# Patient Record
Sex: FEMALE | Race: WHITE | NOT HISPANIC OR LATINO | URBAN - METROPOLITAN AREA
[De-identification: names, ages, dates, MRNs, and addresses within clinical notes are randomized per-mention and may not be internally consistent; named-entity substitution may affect disease eponyms.]

---

## 2017-08-01 ENCOUNTER — GENERIC CONVERSION - ENCOUNTER (OUTPATIENT)
Dept: OTHER | Facility: OTHER | Age: 37
End: 2017-08-01

## 2017-09-19 ENCOUNTER — GENERIC CONVERSION - ENCOUNTER (OUTPATIENT)
Dept: OTHER | Facility: OTHER | Age: 37
End: 2017-09-19

## 2017-11-07 ENCOUNTER — GENERIC CONVERSION - ENCOUNTER (OUTPATIENT)
Dept: OTHER | Facility: OTHER | Age: 37
End: 2017-11-07

## 2018-01-13 NOTE — MISCELLANEOUS
Provider Comments  Provider Comments:   Magdalene Mansfield did not show up for her scheduled Level II ultrasound study in the [facility], [facility]  in [address] [place] [address] on Tuesday morning, November 7, 2017 at 11:00 AM  Additionally, she did not call to reschedule this appointment        Signatures   Electronically signed by : TOM Castro ; Nov 7 2017 12:51PM EST                       (Author)

## 2018-01-14 NOTE — PROGRESS NOTES
2016         RE: Jorge Weaver                               To: Rakesh TenaTOM S N , C N M    MR#: [id number]                                   [address]   ENC: [id number]                             Fax: [phone]   (Exam #: [id number])      The LMP of this 31year old,  3, para 1 patient was unknown, her   working WILD is 2016 and the current gestational age is 33 weeks 6   days by  [facility] [place] [address]  A sonographic examination was performed on 2016 using real time equipment  The ultrasound examination was performed   using abdominal technique  The patient has a BMI of 33 0  Her blood   pressure today was 98/66        Cardiac motion was observed at 125 bpm       INDICATIONS      fetal anatomical survey   pregnancy dating   diabetes, gestational   obesity      Exam Types      LEVEL II      RESULTS      Fetus # 1 of 1   Vertex presentation   Fetal growth appeared normal   Placenta Location = Posterior   No placenta previa   Placenta Grade = II      MEASUREMENTS (* Included In Average GA)      AC              25 4 cm        29 weeks 4 days* (40%)   BPD              7 6 cm        30 weeks 3 days* (52%)   HC              28 2 cm        30 weeks 4 days* (46%)   Femur            5 8 cm        30 weeks 1 day * (49%)      Humerus          5 2 cm        30 weeks 4 days  (63%)   Radius           4 5 cm        32 weeks 2 days   Ulna             4 9 cm        30 weeks 6 days   Tibia            5 1 cm        30 weeks 4 days  (62%)   Fibula           5 1 cm        29 weeks 6 days      Cerebellum       3 8 cm        32 weeks 5 days   Biorbit          4 8 cm        30 weeks 1 day   CisternaMagna    8 5 mm      HC/AC           1 11   FL/AC           0 23   FL/BPD          0 76   EFW (Ac/Fl/Hc)  1491 grams - 3 lbs 5 oz                 (47%)      THE AVERAGE GESTATIONAL AGE is 30 weeks 1 day +/- 18 days       AMNIOTIC FLUID      Q1: 2 1      Q2: 3 0      Q3: 3 3      Q4: 2 8   EUSEBIO Total = 11 2 cm   Amniotic Fluid: Normal      ANATOMY      Head                                    Normal   Face/Neck                               See Details   Th  Cav  Normal   Heart                                   See Details   Abd  Cav  Normal   Stomach                                 Normal   Right Kidney                            Normal   Left Kidney                             Normal   Bladder                                 Normal   Abd  Wall                               Normal   Spine                                   Normal   Extrems                                 See Details   Genitalia                               Normal   Placenta                                Normal      ANATOMY DETAILS      Visualized Appearing Sonographically Normal:   HEAD: (Calvarium, BPD Level, Lateral Ventricles, Choroid Plexus,   Cerebellum, Cisterna Magna);    FACE/NECK: (Neck, Profile, Orbits,   Nose/Lips, Palate, Face);    TH  CAV : (Diaphragm); HEART: (Four   Chamber View, Proximal Left Outflow, Proximal Right Outflow, Short Axis of   Greater Vessels, Cardiac Axis, Interventricular Septum, Interatrial   Septum, Cardiac Position);    ABD  CAV : (Gall Bladder);    STOMACH, RIGHT   KIDNEY, LEFT KIDNEY, BLADDER, ABD  WALL, SPINE: (Cervical Spine, Thoracic   Spine, Lumbar Spine, Sacrum);    EXTREMS: (Rt Humerus, Rt Forearm, Lt   Hand, Rt Hand, Lt Femur, Rt Femur, Rt Low Leg, Lt Foot, Rt Foot);      GENITALIA (Male), PLACENTA      Suboptimally Visualized:   EXTREMS: (Lt Humerus, Lt Forearm, Lt Low Leg)      Not Visualized:   HEART: (Aortic Arch, Ductal Arch)      ANATOMY COMMENTS      No fetal structural abnormality or ultrasound marker for aneuploidy is   identified on the Level II ultrasound study today  The views of the   ARCHES, PCI are limited by FETAL POSITION   The genitalia were reviewed and   found to be male  The patient is not aware of the results of the fetal   sex  Fetal growth and amniotic fluid volume appear normal   Active   movement of the fetal body & extremities was seen  There is no suspicion   of a subchorionic bleed  ADNEXA      The left ovary was not visualized  The right ovary appeared normal and   measured 3 6 x 2 4 x 1 6 cm with a volume of 7 2 cc  IMPRESSION      Mendoza IUP   30 weeks and 1 day by this ultrasound  (WILD=2016)   29 weeks and 6 days by 1st Tri Sono  (WILD=2016)   Vertex presentation   Fetal growth appeared normal   Regular fetal heart rate of 125 bpm   Posterior placenta   No placenta previa      GENERAL COMMENT         I had the pleasure of seeing Cecile Amaro in the  [facility]   [place]  on  for level II ultrasound  She was recently diagnosed   with gestational diabetes and will meet with her diabetes team in the near   future  She has an elevated body mass index of 33  She had a normal   vaginal delivery in   She has a history of herpes simplex virus  She   has also had an appendectomy in the past  She denies any pregnancy   complications to this point  Today's ultrasound was reassuring  A viable fetus was seen in the vertex   presentation  The placenta is posterior in location and there is no   evidence of a placenta previa  The placental cord insertion site appeared   very central which is reassuring  Amniotic fluid appeared normal  Fetal   growth appeared very appropriate and correlated well with her due date  There were no obvious anomalies seen today  The anatomic survey was not   completed today as we did not see the ductal or aortic arch well today  Only the right ovary was seen and it appeared normal  Left ovary was not   well seen  We have done a transvaginal ultrasound to assess for any days   of previa and there was no evidence of any vessels directly over her   cervix  The patient appeared well-nourished, well-developed, and in no apparent   distress  Her uterus is nontender  Her abdomen was gravid and nontender  We discussed kick counts in the office today  We discussed the 10 kicks in   2 hour rule  I asked her to call your office if criteria are not met  She   should continue to do her kick counts on a daily basis  Kick counts should   begin at 28 weeks  Her gestational diabetes appears well controlled  There were no signs of   fetal hyperglycemia on today's ultrasound  I encouraged her to continue to   achieve euglycemia  She should continue to fax her blood sugar reports to   our Diabetes in Pregnancy Program  A post partum 75 gram 2 hour glucose   tolerance test is indicated  MISSED ANATOMY: Ductal and aortic arch      NEW FINDINGS ON TODAY'S ULTRASOUND: None, very reassuring ultrasound today   in the  [address]: Giorgio Taylor had a reassuring ultrasound  The fetus appears to be   growing well and today's ultrasound correlated well with her due date  There were no obvious anatomic abnormalities  It is too late to be   performing down syndrome screening however  We did view her cervix as we   initially could not tell where the cord insertion into the placental is   located however we did not see any vessels directly over her cervix and   eventually did see a normal central cord insertion  She does have   gestational diabetes and I did recommend that she see our diabetes team at   [address]  A followup growth scan also appears indicated  Thank   you kindly for this referral          Total face-to-face time with the patient, excluding ultrasound time was 15   minutes with more than 50% of the time devoted to counseling and   coordination of care  Thank you very much for allowing me to participate in the care of your   patient  If you have any questions or concerns about today's visit, please   do not hesitate to call me  Sincerely,      TOM Quintana  Maternal Fetal Medicine      FLORENCIA Beaulieu M D     Maternal-Fetal Medicine   Electronically signed 04/08/16 15:00

## 2025-03-13 ENCOUNTER — OFFICE VISIT (OUTPATIENT)
Age: 45
End: 2025-03-13
Payer: COMMERCIAL

## 2025-03-13 VITALS
SYSTOLIC BLOOD PRESSURE: 121 MMHG | BODY MASS INDEX: 26.45 KG/M2 | DIASTOLIC BLOOD PRESSURE: 74 MMHG | HEART RATE: 84 BPM | OXYGEN SATURATION: 100 % | WEIGHT: 126 LBS | RESPIRATION RATE: 18 BRPM | TEMPERATURE: 97.8 F | HEIGHT: 58 IN

## 2025-03-13 DIAGNOSIS — L90.5 SCAR: Primary | ICD-10-CM

## 2025-03-13 PROCEDURE — G0382 LEV 3 HOSP TYPE B ED VISIT: HCPCS | Performed by: PHYSICIAN ASSISTANT

## 2025-03-13 PROCEDURE — S9083 URGENT CARE CENTER GLOBAL: HCPCS | Performed by: PHYSICIAN ASSISTANT

## 2025-03-13 RX ORDER — LEVOTHYROXINE SODIUM 88 UG/1
88 TABLET ORAL DAILY
COMMUNITY
Start: 2024-11-29

## 2025-03-13 NOTE — PATIENT INSTRUCTIONS
"Patient Education    Scar of the abdomen secondary to biopsy procedure a week ago  Noninfected  Apply topical antibiotic 3 times daily  Warm compresses  Follow-up with your specialist in 1 week    Follow up with PCP in 3-5 days.  Proceed to  ER if symptoms worsen.     Taking care of cuts, scrapes, and puncture wounds   The Basics   Written by the doctors and editors at [facility]   Does my cut need stitches? -- If your cut does not go all the way through the skin, it does not need stitches (figure 1). If your cut is wide, jagged, or does go all the way through the skin, you will most likely need stitches.  If you are not sure if your cut needs stitches, check with your doctor or nurse. Sometimes they will use special staples instead of stitches. Doctors can also use a special type of skin glue to close certain types of cuts.  This article is about caring for minor wounds (like small cuts and scrapes) that do not need to be closed with stitches, staples, or skin glue. If you got stitches, staples, or glue, your doctor or nurse will tell you how to care for yourself.  What if I have a puncture wound? -- A \"puncture wound\" is a type of cut that is made when a sharp object, like a nail, goes through the skin and into the tissue underneath. This type of wound can also be caused by animal or human bites. Puncture wounds are more likely than other minor wounds to get infected.  If you were bitten by an animal or human, see your doctor or nurse. Bite wounds need special care.  How do I take care of a minor wound on my own? -- To take care of your wound, follow these basic first aid guidelines:   Clean the wound - Wash it well with soap and water. If there is dirt, glass, or another object in your cut that you can't get out after you wash it, call your doctor or nurse.   Stop the bleeding - If your wound is bleeding, press a clean cloth or bandage firmly on the area for 20 minutes. You can also help slow the bleeding by " holding the wound above the level of your heart, if possible. If the bleeding doesn't stop after 20 minutes, call your doctor or nurse.   Put a thin layer of antibiotic ointment on the wound   Cover the wound with a bandage or gauze. Keep the bandage clean and dry. Change the bandage 1 to 2 times every day until your wound heals.   Do not swim or soak your wound in water until it has healed. Ask your doctor or nurse if you have any questions.   Each time you change the bandage, look at your skin to check for signs of infection. These include redness that is getting worse or spreading, swelling, or warmth in the area. You might see some thin clear or yellow fluid as the wound heals, which is normal.  Most minor wounds heal on their own within 7 to 10 days. As your wound heals, a scab will form. Do not pick at the scab or scratch the skin around it.  When should I call the doctor or nurse? -- Call the doctor or nurse if you have any signs of an infection. Signs of an infection include:   Fever   Redness, swelling, warmth, or increased pain around the wound   A bad smell coming from the wound   Pus (thick yellow, green, or gray fluid) draining from the wound   Red streaks on the skin around the wound, or red streaks going up your arm or leg  Will I need a tetanus shot? -- Maybe. It depends on how old you are and when your last tetanus shot was. Tetanus is a serious infection that can cause muscle stiffness and spasms, and even lead to death. It is caused by bacteria (germs) that live in the dirt.  Most children get a tetanus vaccine as part of their routine check-ups. Vaccines can prevent certain serious or deadly infections. Many adults also get a tetanus vaccine as part of their routine check-ups. Getting all your vaccines is important, since it's possible to get tetanus even from a small wound.  If your skin is cut or punctured, and especially if the cut is dirty or deep, ask your doctor or nurse if you need a  "tetanus shot.  All topics are updated as new evidence becomes available and our peer review process is complete.  This topic retrieved from [employer] on: Mar 20, 2024.  Topic 68406 Version 11.0  Release: 32.2.4 - C32.78  © 2024 UpToDate, Inc. and/or its affiliates. All rights reserved.  figure 1: Minor cuts and scrapes     Picture A shows a scrape (also called an \"abrasion\"). The scrape doesn't go all the way through the skin, so it does not need stitches. Picture B shows a cut that does go all the way through the skin. This cut is deep, so it needs stitches.  Graphic 29138 Version 4.0  Consumer Information Use and Disclaimer   Disclaimer: This generalized information is a limited summary of diagnosis, treatment, and/or medication information. It is not meant to be comprehensive and should be used as a tool to help the user understand and/or assess potential diagnostic and treatment options. It does NOT include all information about conditions, treatments, medications, side effects, or risks that may apply to a specific patient. It is not intended to be medical advice or a substitute for the medical advice, diagnosis, or treatment of a health care provider based on the health care provider's examination and assessment of a patient's specific and unique circumstances. Patients must speak with a health care provider for complete information about their health, medical questions, and treatment options, including any risks or benefits regarding use of medications. This information does not endorse any treatments or medications as safe, effective, or approved for treating a specific patient. UpToDate, Inc. and its affiliates disclaim any warranty or liability relating to this information or the use thereof.The use of this information is governed by the Terms of Use, available at https://www.[employer].com/en/know/clinical-effectiveness-terms. 2024© UpToDate, Inc. and its affiliates and/or licensors. All rights " reserved.  Copyright   © 2024 [employer] and/or its affiliates. All rights reserved.

## 2025-03-13 NOTE — PROGRESS NOTES
[facility]        NAME: Ruby Yeager is a 45 y.o. female  : 1980    MRN: [id number]  DATE: 2025  TIME: 8:14 PM    Assessment and Plan   Scar [L90.5]  1. Scar              Patient Instructions     Scar of the abdomen secondary to biopsy procedure a week ago  Noninfected  Apply topical antibiotic 3 times daily  Warm compresses  With soft bristle brush mechanically brushed over the area gently during showers to remove scar  Follow-up with your specialist in 1 week    Follow up with PCP in 3-5 days.  Proceed to  ER if symptoms worsen.      Chief Complaint     Chief Complaint   Patient presents with    Wound Check     Patient states she had a biopsy a week ago below left breast. Complains of, redness, and pus, painful when wearing bra.         History of Present Illness       45-year-old female is presenting today with complaint of discomfort over a lesion that was biopsied under the left breast a week ago.  Patient reported taking time to heal.        Review of Systems   Review of Systems   Constitutional:  Negative for fever.   Respiratory:  Negative for cough.    Gastrointestinal:  Negative for nausea and vomiting.   Skin:  Positive for wound.   Neurological:  Negative for headaches.         Current Medications       Current Outpatient Medications:     levothyroxine 88 mcg tablet, Take 88 mcg by mouth daily, Disp: , Rfl:     Current Allergies     Allergies as of 2025 - Reviewed 2025   Allergen Reaction Noted    Pollen extract Sneezing 10/20/2022            The following portions of the patient's history were reviewed and updated as appropriate: allergies, current medications, past family history, past medical history, past social history, past surgical history and problem list.     History reviewed. No pertinent past medical history.    History reviewed. No pertinent surgical history.    History reviewed. No pertinent family history.      Medications have been  "verified.        Objective   /74 (BP Location: Right arm, Patient Position: Sitting)   Pulse 84   Temp 97.8 °F (36.6 °C)   Resp 18   Ht 4' 10\" (1.473 m)   Wt 57.2 kg (126 lb)   SpO2 100%   BMI 26.33 kg/m²        Physical Exam     Physical Exam  Vitals and nursing note reviewed. Exam conducted with a chaperone present (JAILYN Conway).   Constitutional:       General: She is not in acute distress.     Appearance: Normal appearance. She is normal weight. She is not ill-appearing, toxic-appearing or diaphoretic.   HENT:      Nose: Nose normal.   Eyes:      General: No scleral icterus.     Extraocular Movements: Extraocular movements intact.      Conjunctiva/sclera: Conjunctivae normal.      Pupils: Pupils are equal, round, and reactive to light.   Cardiovascular:      Rate and Rhythm: Normal rate and regular rhythm.      Pulses: Normal pulses.   Pulmonary:      Effort: Pulmonary effort is normal. No respiratory distress.   Chest:          Comments: 7 mm eschar lesion localized to the epigastric region under the left breast slight halo inflammation.  No discharge or active bleeding nontender.  Musculoskeletal:         General: Normal range of motion.      Cervical back: Normal range of motion.   Neurological:      General: No focal deficit present.      Mental Status: She is alert and oriented to person, place, and time.      Coordination: Coordination normal.      Gait: Gait normal.   Psychiatric:         Mood and Affect: Mood normal.         Behavior: Behavior normal.         Thought Content: Thought content normal.         Judgment: Judgment normal.                   "